# Patient Record
Sex: MALE | Race: WHITE | NOT HISPANIC OR LATINO | URBAN - METROPOLITAN AREA
[De-identification: names, ages, dates, MRNs, and addresses within clinical notes are randomized per-mention and may not be internally consistent; named-entity substitution may affect disease eponyms.]

---

## 2017-11-06 ENCOUNTER — OFFICE VISIT - HEALTHEAST (OUTPATIENT)
Dept: FAMILY MEDICINE | Facility: CLINIC | Age: 72
End: 2017-11-06

## 2017-11-06 ENCOUNTER — RECORDS - HEALTHEAST (OUTPATIENT)
Dept: GENERAL RADIOLOGY | Facility: CLINIC | Age: 72
End: 2017-11-06

## 2017-11-06 DIAGNOSIS — S60.459A: ICD-10-CM

## 2017-11-06 DIAGNOSIS — S69.90XA FINGER INJURY: ICD-10-CM

## 2017-11-06 DIAGNOSIS — S69.90XA UNSPECIFIED INJURY OF UNSPECIFIED WRIST, HAND AND FINGER(S), INITIAL ENCOUNTER: ICD-10-CM

## 2017-11-06 ASSESSMENT — MIFFLIN-ST. JEOR: SCORE: 1259.85

## 2017-11-07 ENCOUNTER — RECORDS - HEALTHEAST (OUTPATIENT)
Dept: ADMINISTRATIVE | Facility: OTHER | Age: 72
End: 2017-11-07

## 2017-11-13 ENCOUNTER — HOSPITAL ENCOUNTER (OUTPATIENT)
Dept: ULTRASOUND IMAGING | Facility: CLINIC | Age: 72
Discharge: HOME | End: 2017-11-13
Attending: PHYSICIAN ASSISTANT

## 2017-11-13 ENCOUNTER — RECORDS - HEALTHEAST (OUTPATIENT)
Dept: ADMINISTRATIVE | Facility: OTHER | Age: 72
End: 2017-11-13

## 2017-11-13 DIAGNOSIS — M79.644 FINGER PAIN, RIGHT: ICD-10-CM

## 2017-11-15 ENCOUNTER — RECORDS - HEALTHEAST (OUTPATIENT)
Dept: ADMINISTRATIVE | Facility: OTHER | Age: 72
End: 2017-11-15

## 2018-06-06 ENCOUNTER — OFFICE VISIT - HEALTHEAST (OUTPATIENT)
Dept: AUDIOLOGY | Facility: CLINIC | Age: 73
End: 2018-06-06

## 2018-06-06 DIAGNOSIS — H93.11 TINNITUS OF RIGHT EAR: ICD-10-CM

## 2018-06-06 DIAGNOSIS — H90.3 SENSORINEURAL HEARING LOSS, BILATERAL: ICD-10-CM

## 2018-06-06 DIAGNOSIS — H93.8X1 PLUGGED FEELING IN EAR, RIGHT: ICD-10-CM

## 2018-12-12 ENCOUNTER — OFFICE VISIT - HEALTHEAST (OUTPATIENT)
Dept: OTOLARYNGOLOGY | Facility: CLINIC | Age: 73
End: 2018-12-12

## 2018-12-12 ENCOUNTER — OFFICE VISIT - HEALTHEAST (OUTPATIENT)
Dept: AUDIOLOGY | Facility: CLINIC | Age: 73
End: 2018-12-12

## 2018-12-12 DIAGNOSIS — H61.23 BILATERAL IMPACTED CERUMEN: ICD-10-CM

## 2018-12-12 DIAGNOSIS — H90.3 SENSORINEURAL HEARING LOSS, BILATERAL: ICD-10-CM

## 2018-12-12 DIAGNOSIS — H93.11 TINNITUS OF RIGHT EAR: ICD-10-CM

## 2019-03-18 ENCOUNTER — COMMUNICATION - HEALTHEAST (OUTPATIENT)
Dept: FAMILY MEDICINE | Facility: CLINIC | Age: 74
End: 2019-03-18

## 2019-03-18 ENCOUNTER — OFFICE VISIT - HEALTHEAST (OUTPATIENT)
Dept: FAMILY MEDICINE | Facility: CLINIC | Age: 74
End: 2019-03-18

## 2019-03-18 DIAGNOSIS — Z01.818 PREOPERATIVE EVALUATION TO RULE OUT SURGICAL CONTRAINDICATION: ICD-10-CM

## 2019-03-18 DIAGNOSIS — S92.001P: ICD-10-CM

## 2019-03-18 DIAGNOSIS — Z88.0 PENICILLIN ALLERGY: ICD-10-CM

## 2019-03-18 LAB
ALBUMIN SERPL-MCNC: 3.4 G/DL (ref 3.5–5)
ALP SERPL-CCNC: 64 U/L (ref 45–120)
ALT SERPL W P-5'-P-CCNC: 18 U/L (ref 0–45)
ANION GAP SERPL CALCULATED.3IONS-SCNC: 8 MMOL/L (ref 5–18)
AST SERPL W P-5'-P-CCNC: 26 U/L (ref 0–40)
ATRIAL RATE - MUSE: 60 BPM
BILIRUB SERPL-MCNC: 0.4 MG/DL (ref 0–1)
BUN SERPL-MCNC: 14 MG/DL (ref 8–28)
CALCIUM SERPL-MCNC: 9 MG/DL (ref 8.5–10.5)
CHLORIDE BLD-SCNC: 95 MMOL/L (ref 98–107)
CO2 SERPL-SCNC: 28 MMOL/L (ref 22–31)
CREAT SERPL-MCNC: 0.73 MG/DL (ref 0.7–1.3)
DIASTOLIC BLOOD PRESSURE - MUSE: NORMAL MMHG
ERYTHROCYTE [DISTWIDTH] IN BLOOD BY AUTOMATED COUNT: 12.1 % (ref 11–14.5)
GFR SERPL CREATININE-BSD FRML MDRD: >60 ML/MIN/1.73M2
GLUCOSE BLD-MCNC: 94 MG/DL (ref 70–125)
HCT VFR BLD AUTO: 41.1 % (ref 40–54)
HGB BLD-MCNC: 13.8 G/DL (ref 14–18)
INTERPRETATION ECG - MUSE: NORMAL
MCH RBC QN AUTO: 30.7 PG (ref 27–34)
MCHC RBC AUTO-ENTMCNC: 33.6 G/DL (ref 32–36)
MCV RBC AUTO: 91 FL (ref 80–100)
P AXIS - MUSE: 60 DEGREES
PLATELET # BLD AUTO: 361 THOU/UL (ref 140–440)
PMV BLD AUTO: 6.8 FL (ref 7–10)
POTASSIUM BLD-SCNC: 4.3 MMOL/L (ref 3.5–5)
PR INTERVAL - MUSE: 184 MS
PROT SERPL-MCNC: 6.3 G/DL (ref 6–8)
QRS DURATION - MUSE: 76 MS
QT - MUSE: 444 MS
QTC - MUSE: 444 MS
R AXIS - MUSE: 70 DEGREES
RBC # BLD AUTO: 4.5 MILL/UL (ref 4.4–6.2)
SODIUM SERPL-SCNC: 131 MMOL/L (ref 136–145)
SYSTOLIC BLOOD PRESSURE - MUSE: NORMAL MMHG
T AXIS - MUSE: 68 DEGREES
VENTRICULAR RATE- MUSE: 60 BPM
WBC: 6.1 THOU/UL (ref 4–11)

## 2019-03-21 ENCOUNTER — COMMUNICATION - HEALTHEAST (OUTPATIENT)
Dept: FAMILY MEDICINE | Facility: CLINIC | Age: 74
End: 2019-03-21

## 2019-04-22 ENCOUNTER — OFFICE VISIT - HEALTHEAST (OUTPATIENT)
Dept: ALLERGY | Facility: CLINIC | Age: 74
End: 2019-04-22

## 2019-04-22 DIAGNOSIS — Z88.9 DRUG ALLERGY: ICD-10-CM

## 2019-04-22 ASSESSMENT — MIFFLIN-ST. JEOR: SCORE: 1259.85

## 2019-06-10 ENCOUNTER — AMBULATORY - HEALTHEAST (OUTPATIENT)
Dept: FAMILY MEDICINE | Facility: CLINIC | Age: 74
End: 2019-06-10

## 2019-06-10 DIAGNOSIS — H90.3 ASYMMETRICAL SENSORINEURAL HEARING LOSS: ICD-10-CM

## 2019-09-04 ENCOUNTER — OFFICE VISIT - HEALTHEAST (OUTPATIENT)
Dept: OTOLARYNGOLOGY | Facility: CLINIC | Age: 74
End: 2019-09-04

## 2019-09-04 DIAGNOSIS — H61.23 BILATERAL IMPACTED CERUMEN: ICD-10-CM

## 2020-06-04 ENCOUNTER — COMMUNICATION - HEALTHEAST (OUTPATIENT)
Dept: SCHEDULING | Facility: CLINIC | Age: 75
End: 2020-06-04

## 2020-06-04 ENCOUNTER — OFFICE VISIT - HEALTHEAST (OUTPATIENT)
Dept: FAMILY MEDICINE | Facility: CLINIC | Age: 75
End: 2020-06-04

## 2020-06-04 DIAGNOSIS — S69.92XA INJURY OF FINGER OF LEFT HAND, INITIAL ENCOUNTER: ICD-10-CM

## 2020-06-04 DIAGNOSIS — L03.011 PARONYCHIA OF FINGER OF RIGHT HAND: ICD-10-CM

## 2020-08-12 ENCOUNTER — RECORDS - HEALTHEAST (OUTPATIENT)
Dept: ADMINISTRATIVE | Facility: OTHER | Age: 75
End: 2020-08-12

## 2020-12-21 ENCOUNTER — OFFICE VISIT - HEALTHEAST (OUTPATIENT)
Dept: OTOLARYNGOLOGY | Facility: CLINIC | Age: 75
End: 2020-12-21

## 2020-12-21 ENCOUNTER — OFFICE VISIT - HEALTHEAST (OUTPATIENT)
Dept: FAMILY MEDICINE | Facility: CLINIC | Age: 75
End: 2020-12-21

## 2020-12-21 DIAGNOSIS — K14.8 TONGUE LESION: ICD-10-CM

## 2020-12-24 LAB
LAB AP CHARGES (HE HISTORICAL CONVERSION): NORMAL
PATH REPORT.COMMENTS IMP SPEC: NORMAL
PATH REPORT.FINAL DX SPEC: NORMAL
PATH REPORT.GROSS SPEC: NORMAL
PATH REPORT.MICROSCOPIC SPEC OTHER STN: NORMAL
PATH REPORT.RELEVANT HX SPEC: NORMAL
RESULT FLAG (HE HISTORICAL CONVERSION): NORMAL

## 2021-01-08 ENCOUNTER — COMMUNICATION - HEALTHEAST (OUTPATIENT)
Dept: OTOLARYNGOLOGY | Facility: CLINIC | Age: 76
End: 2021-01-08

## 2021-01-13 ENCOUNTER — COMMUNICATION - HEALTHEAST (OUTPATIENT)
Dept: OTOLARYNGOLOGY | Facility: CLINIC | Age: 76
End: 2021-01-13

## 2021-05-28 NOTE — PATIENT INSTRUCTIONS - HE
Assessment:    Distant history of penicillin allergy from childhood however had amoxicillin approximately 10 to 15 years ago.  No symptoms of immediate hypersensitivity with that.  Therefore, Venu should not be considered allergic to penicillin.    Plan:    All penicillin family antibiotics can be used and drug allergies would be removed from his allergy list.

## 2021-05-28 NOTE — PROGRESS NOTES
Assessment:    Distant history of penicillin allergy from childhood however had amoxicillin approximately 10 years ago.  No symptoms of immediate hypersensitivity with that.  Therefore, Venu should not be considered allergic to penicillin.    Plan:    All penicillin family antibiotics can be used  We will remove penicillins from his drug allergy list.  ____________________________________________________________________________     Patient comes in today for penicillin allergy evaluation.  He reports that he had a reaction to penicillin when he was approximately 4 years old.  He reports he had an appendicitis at that time and had surgery.  He was given antibiotic at that time and reportedly had a reaction.  We do not have those medical records available.  The details of that reaction are unknown.  He does not think there was a life-threatening reaction.  Since that time he has had penicillin allergy labeled in his chart.  However, he had amoxicillin approximately 10 years ago.  He recalls being ill with upper respiratory symptoms he took a full course of amoxicillin.  He seemed to get worse with symptoms afterwards and was ultimately diagnosed with sepsis however no symptoms of hives swelling or wheezing with amoxicillin.    Review of symptoms:  As above, otherwise negative    Past medical history: No other chronic medical conditions noted.    Allergies: No known allergies to medications, latex, foods or hymenoptera venom    Social history: Currently is a house with radiator heat.  There is central air conditioning in her basement present.  2 cats at home.  No cigarette smoking history.    Medications: reviewed in chart    20 min spent in direct contact with the patient.  More than 50% in counseling regarding drug allergy

## 2021-05-31 VITALS — BODY MASS INDEX: 20.41 KG/M2 | HEIGHT: 66 IN | WEIGHT: 127 LBS

## 2021-06-01 NOTE — PROGRESS NOTES
HPI: This patient presents to clinic today for cerumen removal. Has noticed some fullness of the ears and muffled hearing, but denies otalgia, otorrhea, vertigo, change in true hearing or tinnitus. Denies other symptoms.    Past medical history, surgical history, family history, social history, medications, and allergies have been reviewed and are documented above.     Review of Systems: a 10-system review was performed. Symptoms are noted in the HPI and on a scanned document in the computer chart.    Physical Examination:   GEN: no acute distress, alert and oriented  EYES: extraocular movements intact, pupils equal and round. Sclera clear.   EARS: bilateral cerumen impactions cleared under binocular microscopy using microdissection. The tympanic membranes are noted to be intact and clear with no signs of infections, effusions, perforations, or retractions.  PULM: breathing comfortably on room air with no stertor or stridor. Chest expansion symmetric.  HEART: regular rate and rhythm, no peripheral edema    MEDICAL DECISION-MAKING: cerumen impactions cleared under binocular microscopy without difficulty. Hearing restored to baseline. Follow-up PRN.

## 2021-06-02 VITALS — WEIGHT: 127 LBS | BODY MASS INDEX: 20.41 KG/M2 | HEIGHT: 66 IN

## 2021-06-05 VITALS
BODY MASS INDEX: 22.12 KG/M2 | OXYGEN SATURATION: 99 % | WEIGHT: 136 LBS | RESPIRATION RATE: 16 BRPM | HEART RATE: 64 BPM | DIASTOLIC BLOOD PRESSURE: 77 MMHG | SYSTOLIC BLOOD PRESSURE: 118 MMHG

## 2021-06-08 NOTE — PATIENT INSTRUCTIONS - HE
Continue symptomatic care.  Take the prescribed antibiotic as directed, you can add some probiotic to minimize side effect.  Follow-up in 10 to 14 days if not improved.

## 2021-06-08 NOTE — TELEPHONE ENCOUNTER
Triage call:   Scraped his finger a couple weeks ago on a bush   Right index knuckle   Reports that there is a white cap to the injury and is red and swollen across the top of the knuckle  States that he stuck a pin in the area that was white and was unable to get anything out- states that the white area doesn't hurt but the red area is very painful.    Has been soak in Epson salts   No streaks up his arm   Reports that it throbs at night- painful to bend the finger   Initially was just a very superficial scrape     Triaged to be seen within the next 4 hours- patient states that he is unable to schedule a video visit - patient thinks that this might need to be seen in person. Need PCP approval to be seen in the office.  Reviewed additional care advice with patient and he verbalizes understanding. PCP please advise on face to face visit today.     Em Shepherd RN BSBA [facility] Triage/Med Refill 6/4/2020 7:33 AM      Reason for Disposition    Looks infected (e.g., spreading redness, pus, red streak)    Additional Information    Negative: Major bleeding (actively dripping or spurting) that can't be stopped    Negative: Sounds like a life-threatening emergency to the triager    Negative: Wound looks infected    Negative: Caused by animal bite    Negative: Amputation    Negative: High-pressure injection injury (e.g., from paint gun, usually work-related)    Negative: Looks like a broken bone or dislocated joint (e.g., crooked or deformed)    Negative: Skin is split open or gaping (length > 1/2 inch or 12 mm)    Negative: Cut or scrape is very deep (e.g., can see bone or tendons)    Negative: Bleeding won't stop after 10 minutes of direct pressure (using correct technique)    Negative: Dirt in the wound and not removed after 15 minutes of scrubbing    Negative: Cut with numbness (loss of sensation) of finger    Negative: Fingernail is partially torn from a crush injury (EXCEPTION: torn nail from catching it on  something)    Negative: Sounds like a serious injury to the triager    Protocols used: FINGER INJURY-A-OH

## 2021-06-08 NOTE — TELEPHONE ENCOUNTER
Spoke w/patient, relayed message per MD. Scheduled video visit for today @11:20am. Please send to email address, patient has [employer] computer.

## 2021-06-08 NOTE — PROGRESS NOTES
"Venu Dunham is a 74 y.o. male who is being evaluated via a billable video visit.      The patient has been notified of following:     \"This video visit will be conducted via a call between you and your physician/provider. We have found that certain health care needs can be provided without the need for an in-person physical exam.  This service lets us provide the care you need with a video conversation.  If a prescription is necessary we can send it directly to your pharmacy.  If lab work is needed we can place an order for that and you can then stop by our lab to have the test done at a later time.    Video visits are billed at different rates depending on your insurance coverage. Please reach out to your insurance provider with any questions.    If during the course of the call the physician/provider feels a video visit is not appropriate, you will not be charged for this service.\"    Patient has given verbal consent to a Video visit? Yes    Patient would like to receive their AVS by AVS Preference: Aide.    Patient would like the video invitation sent by: Send to e-mail at: [email or web address]    Will anyone else be joining your video visit? No        Video Start Time: 11:20 am    Additional provider notes: Patient complains of surface infection top of mid knuckle right index finger.  Sealed over a white cap with surrounding inflammation which does not heal.  Started after cutting branches a few days ago.  Did try to poke a needle in it but very minimal pus came out.  Denies any fever chills or difficulty moving his right knuckle.     GENERAL: Healthy, alert and no distress  EYES: Eyes grossly normal to inspection. No discharge or erythema, or obvious scleral/conjunctival abnormalities.  RESP: No audible wheeze, cough, or visible cyanosis.  No visible retractions or increased work of breathing.    SKIN: Right index knuckle with small redness for about half a centimeter-a small white dot area. "  Difficult to assess sensation and range of motion on video.  NEURO: Cranial nerves grossly intact. Mentation and speech appropriate for age.  PSYCH: Mentation appears normal, affect normal/bright, judgement and insight intact, normal speech and appearance well-groomed  Venu was seen today for hand pain.    Diagnoses and all orders for this visit:    Paronychia of finger of right hand      Treatment options with risk and benefit discussed with the patient, included incision and drainage, oral antibiotic etc., patient would like an incision and drainage, will have him, the clinic tomorrow.    Video-Visit Details    Type of service:  Video Visit    Video End Time (time video stopped): 11:40 am  Originating Location (pt. Location): Home    Distant Location (provider location):  [facility] FAMILY MEDICINE/OB     Platform used for Video Visit: Ismael Meyers MD  Addendum made on June 5, 2020.  Patient was scheduled for a procedure today, he did call stating that he was able to squeeze the area on the right knuckle and he did express good amount of pus and it  looks less swollen and painful, still a little bit red, he would rather have an antibiotic and will let us know if not improved.    Discussed continue symptomatic care, will send cephalexin to take as directed to follow-up if not improving.

## 2021-06-13 NOTE — PROGRESS NOTES
"HISTORY OF PRESENT ILLNESS  Asked to see by Dr. Meyers for evaluation of tongue. Patient reports tongue lesion. Patient reports that he saw something in the mouth that is of concern. His tongue is sore in the morning and burns when he stretche it. He reports that it feels \"bubbly to his tongue on the left.\" No problems swallowing. No change in voice.    REVIEW OF SYSTEMS  Review of Systems: a 10-system review was performed. Pertinent positives are noted in the HPI and on a separate scanned document in the chart.    PMH, PSH, FH and SH has documented in the EHR.      EXAM    CONSTITUTIONAL  General Appearance:  Normal, well developed, well nourished, no obvious distress  Ability to Communicate:  communicates appropriately.    HEAD AND FACE  Appearance and Symmetry:  Normal, no scalp or facial scarring or suspicious lesions.    EYE  Normal external eye, conjunctiva, lids, cornea.     EARS  Clinical speech reception threshold:  Normal, able to hear normal speech.  Auricle:  Normal, Auricles without scars, lesions, masses.  External auditory canal:  Normal, External auditory canal normal.  Tympanic membrane:  Normal, Tympanic membranes normal without swelling or erythema.  Tympanic membrane mobility:  Normal, Normal tympanic membrane mobility.    NOSE (speculum or scope)  Architecture:  Normal, Grossly normal external nasal architecture with no masses or lesions.  Mucosa:  Normal mucosa, No polyps or masses.  Septum:  Normal, Septum non-obstructing.  Turbinates:  Normal, No turbinate abnormalities    ORAL CAVITY AND OROPHARYNX  Lips:  Normal.  Dental and gingiva:  Normal, No obvious dental or gingival disease.  Mucosa:  Normal, Moist mucous membranes.  Tongue:  Leukoplakia on the left lateral tongue.  Hard and soft palate:  Normal, Hard and soft palate without cleft or mucosal lesions.  Oral pharynx:  Normal, Posterior pharynx without lesions or remarkable asymmetry.  Saliva:  Normal, Clear saliva.  Masses:  Normal, No " palpable masses or pathologically enlarged lymph nodes.    NECK  Masses/lymph nodes:  Normal, No worrisome neck masses or lymph nodes.  Salivary glands:  Normal, Parotid and submandibular glands.  Trachea and larynx position:  Normal, Trachea and larynx midline.  Thyroid:  Normal, No thyroid abnormality.  Tenderness:  Normal, No cervical tenderness.  Suppleness:  Normal, Neck supple    CARDIOVASCULAR  Regular rate and rhythm.  No cyanosis, clubbing or edema.    PULSES  Carotid pulses normal    NEUROLOGICAL  Speech pattern:  Normal, Proasaic    RESPIRATORY  Symmetry and Respiratory effort:  Normal, Symmetric chest movement and expansion with no increased intercostal retractions or use of accessory muscles.     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 No evidence of leukoplakia anywhere else in the oral cavity.    RECOMMENDATION  I discussed findings and concern. I advised biopsy and the patient agreed. The area was infiltrated with lidocaine plus epinephrine solution. After allowing adequate time for anesthesia, a 4mm punch was used to incise the tongue. The specimen was removed with scissors and pickup and placed in formalin. I explained that we will be in touch with resultsl    Syed Gillette MD

## 2021-06-13 NOTE — PROGRESS NOTES
"OFFICE VISIT - FAMILY MEDICINE     ASSESSMENT AND PLAN     1. Finger injury  XR Finger Right 2 or More VWS    Ambulatory referral to Orthopedics   2. Foreign body of finger of right hand  Ambulatory referral to Orthopedics   X-rays personally reviewed did not show presence of any foreign body, after discussion with the patient, he would like further treatment to be done included possible incision and drainage, he was referred to hand specialist.    CHIEF COMPLAINT   Hand Problem (\"FINGER INFECTION\", TRIGGER FINGER X 1 WK)    HPI   Venu Dunham is a 72 y.o. male.  Updated MIIC - Needs DXA/Zosavax/PC-13    Right index finger injury about a week ago while doing some yard work, he was able to pull out a small  Sliver on the distal portion of the medial index finger.  Has been noticing some swelling in that area,but no difficulty moving his finger, no discharge, no redness.  Patient is concerned about infection, minimal discomfort.  No Numbness or tingling.  Review of Systems As per HPI, otherwise negative.    OBJECTIVE   /78 (Patient Site: Right Arm)  Pulse 72  Resp 14  Ht 5' 5.75\" (1.67 m)  Wt 127 lb (57.6 kg)  BMI 20.65 kg/m2  Physical Exam   Constitutional: He is oriented to person, place, and time. He appears well-developed and well-nourished.   HENT:   Head: Normocephalic and atraumatic.   Cardiovascular: Normal rate, regular rhythm, normal heart sounds and intact distal pulses.  Exam reveals no gallop and no friction rub.    No murmur heard.  Pulmonary/Chest: Effort normal and breath sounds normal. No respiratory distress. He has no wheezes. He has no rales.   Musculoskeletal: He exhibits edema (Right index  distal interphalangeal joint area, no discharge, No open wound). He exhibits no tenderness or deformity.   Neurological: He is alert and oriented to person, place, and time. Coordination normal.   Psychiatric: He has a normal mood and affect. Judgment and thought content normal.       PFSH "   No family history on file.  Social History     Social History     Marital status:      Spouse name: N/A     Number of children: N/A     Years of education: N/A     Occupational History     Not on file.     Social History Main Topics     Smoking status: Never Smoker     Smokeless tobacco: Never Used     Alcohol use 0.6 oz/week     1 Glasses of wine per week     Drug use: Not on file     Sexual activity: Not on file     Other Topics Concern     Not on file     Social History Narrative     Relevant history was reviewed with the patient today, unless noted in HPI, nothing is pertinent for this visit.  [facility]     Patient Active Problem List    Diagnosis Date Noted     External Hemorrhoids      Overview Note:     Created by Conversion    Replacement Utility updated for latest IMO load       Skin Condition      Overview Note:     Created by [employer]  [facility] Annotation: Jul 23 2013  4:36PM - Carlos Varma: left facial    Replacement Utility updated for latest IMO load       Past Surgical History:   Procedure Laterality Date     NE APPENDECTOMY      Description: Appendectomy;  Recorded: 04/22/2014;     NE KNEE SCOPE,DIAGNOSTIC      Description: Arthroscopy Knee Left;  Recorded: 04/22/2014;       RESULTS/CONSULTS (Lab/Rad)   No results found for this or any previous visit (from the past 168 hour(s)).  Xr Finger Right 2 Or More Vws    Result Date: 11/6/2017  XR FINGER RIGHT 2 OR MORE VWS 11/6/2017 10:02 AM INDICATION: Sliver, swollen, pressure. COMPARISON: None. FINDINGS: No fracture or dislocation. No bony erosion. Soft tissue swelling. No opaque foreign body. This report was electronically interpreted by: Dr. Bryant Pool DO ON 11/06/2017 at 10:43    MEDICATIONS     Current Outpatient Prescriptions on File Prior to Visit   Medication Sig Dispense Refill     aspirin 81 MG EC tablet Take 81 mg by mouth daily.       No current facility-administered medications on file prior to visit.        [employer]  MAINTENANCE / SCREENING   PHQ-2 Total Score: 0 (11/6/2017  9:44 AM), No Data Recorded,No Data Recorded  Immunization History   Administered Date(s) Administered     Hep A, historic 12/16/1998, 10/26/1999     Hep B, historic 10/26/1999, 12/07/1999, 06/26/2000     IPV 04/05/1993     Influenza, inj, historic 12/16/1998, 10/22/1999, 10/04/2010, 09/28/2012, 09/18/2014, 10/04/2015, 10/04/2016, 10/11/2017     Influenza, seasonal,quad inj 6-35 mos 10/27/2011, 09/23/2013     Pneumo Polysac 23-V 04/22/2014     Td, historic 10/05/1992, 08/02/2002, 04/22/2014     Health Maintenance   Topic     ADVANCE DIRECTIVES DISCUSSED WITH PATIENT      ZOSTER VACCINE      PNEUMOCOCCAL CONJUGATE VACCINE FOR ADULTS (PCV13 OR PREVNAR)      INFLUENZA VACCINE RULE BASED (1)     FALL RISK ASSESSMENT      COLONOSCOPY      TD 18+ HE      PNEUMOCOCCAL POLYSACCHARIDE VACCINE AGE 65 AND OVER        Marco A Meyers MD  Family Medicine, [facility]     This note was dictated using a voice recognition software.  Any grammatical or context distortion are unintentional and inherent to the software.

## 2021-06-14 NOTE — TELEPHONE ENCOUNTER
----- Message from Syed VALLECILLO MD sent at 1/6/2021  4:50 PM CST -----  You can let the patient know that his biopsy did not show evidence of cancer. Follow up if questions.   ----- Message -----  From: Lab, Background User  Sent: 12/24/2020  12:39 PM CST  To: Syed VALLECILLO MD

## 2021-06-14 NOTE — TELEPHONE ENCOUNTER
Tried calling patient. No answer. No voicemail available.    Sapphire Latham RN  [facility]  [phone]

## 2021-06-14 NOTE — TELEPHONE ENCOUNTER
Unable to reach patient via telephone. Results sent via [employer].    Sapphire Latham RN  [facility]  [phone]

## 2021-06-18 NOTE — PROGRESS NOTES
Audiology Report:      History:  Venu Dunham is seen today for comprehensive hearing evaluation. He has a history of difficulty hearing, tinnitus, and aural fullness in the right ear for the past 3 years. He reports the tinnitus sounds like a constant hiss. 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 He denies a history of otalgia, otorrhea, dizziness, ear surgery, and family history of hearing loss.     Results:     Left Ear Right Ear   Otoscopy clear canals Nearly occluding cerumen   Pure Tone Audiometry normal hearing from 250-4000 Hz sloping to mild sensorineural hearing loss  normal hearing from 250-2000 Hz sloping to moderately-severe sensorineural hearing loss    Word Recognition excellent excellent   Tympanometry normal (Type A)  normal (Type A)     Transducer: Insert earphones and Circumaural headphones    Reliability was good and there was good SRT to PTA agreement.       Plan:  Results are discussed in detail. A referral to ENT is recommended due to the asymmetrical hearing loss and tinnitus. Venu is not interested in scheduling an appointment with an ENT at this time. He should return for retesting in one year to monitor the asymmetrical hearing loss. He is counseled on tinnitus.    Please see audiogram under  media  and  audiogram  in the patient s chart.     Prosper Colbert., CCC-A  [place] Licensed Audiologist #[id number]

## 2021-06-19 NOTE — LETTER
Letter by Marco A Meyers MD at      Author: Marco A Meyers MD Service: -- Author Type: --    Filed:  Encounter Date: 3/21/2019 Status: (Other)         Venu Dunham  [address]  [place] [address]             March 21, 2019         Dear Mr. Dunham,    Below are the results from your recent visit:    Resulted Orders   HM2(CBC w/o Differential)   Result Value Ref Range    WBC 6.1 4.0 - 11.0 thou/uL    RBC 4.50 4.40 - 6.20 mill/uL    Hemoglobin 13.8 (L) 14.0 - 18.0 g/dL    Hematocrit 41.1 40.0 - 54.0 %    MCV 91 80 - 100 fL    MCH 30.7 27.0 - 34.0 pg    MCHC 33.6 32.0 - 36.0 g/dL    RDW 12.1 11.0 - 14.5 %    Platelets 361 140 - 440 thou/uL    MPV 6.8 (L) 7.0 - 10.0 fL   Comprehensive Metabolic Panel   Result Value Ref Range    Sodium 131 (L) 136 - 145 mmol/L    Potassium 4.3 3.5 - 5.0 mmol/L    Chloride 95 (L) 98 - 107 mmol/L    CO2 28 22 - 31 mmol/L    Anion Gap, Calculation 8 5 - 18 mmol/L    Glucose 94 70 - 125 mg/dL    BUN 14 8 - 28 mg/dL    Creatinine 0.73 0.70 - 1.30 mg/dL    GFR MDRD Af Amer >60 >60 mL/min/1.73m2    GFR MDRD Non Af Amer >60 >60 mL/min/1.73m2    Bilirubin, Total 0.4 0.0 - 1.0 mg/dL    Calcium 9.0 8.5 - 10.5 mg/dL    Protein, Total 6.3 6.0 - 8.0 g/dL    Albumin 3.4 (L) 3.5 - 5.0 g/dL    Alkaline Phosphatase 64 45 - 120 U/L    AST 26 0 - 40 U/L    ALT 18 0 - 45 U/L    Narrative    Fasting Glucose reference range is 70-99 mg/dL per  American Diabetes Association (ADA) guidelines.       Sodium level was mildly low, we will recheck in the few weeks.    Please call with questions or contact us using [employer].    Sincerely,        Electronically signed by Marco A Meyers MD
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
## 2021-06-22 NOTE — PROGRESS NOTES
" HPI  Venu Dunham is a 73 y.o. male seen in consultation at the request of Dr. Meyers for a sound in his right ear and wax in his ear. Since getting his ear suctioned 3 years ago for cerumen removal, patient notes a \"jingling\" soun in his right ear when his head is back and he swallows.  Denies history of ear infections or surgeries.  Denies vertigo.  Denies family history of hearing loss.  Denies noise exposure.HPI    Past medical history, surgical history, family history, social history, medications, and allergies have been reviewed and are documented above.     Review of Systems: a 10-system review was performed. Symptoms are noted in the HPI and on a scanned document in the computer chart.    Physical Examination:   GEN: no acute distress, alert and oriented  EYES: extraocular movements intact, pupils equal and round. Sclera clear.   EARS: bilateral cerumen impactions cleared under binocular microscopy using microdissection. The tympanic membranes are noted to be intact and clear with no signs of infections, effusions, perforations, or retractions.  PULM: breathing comfortably on room air with no stertor or stridor. Chest expansion symmetric.  HEART: regular rate and rhythm, no peripheral edema    MEDICAL DECISION-MAKING: cerumen impactions cleared under binocular microscopy without difficulty. Hearing restored to baseline. Follow-up audiology.    "

## 2021-06-25 NOTE — TELEPHONE ENCOUNTER
Question following Office Visit  When did you see your provider: 3/18/2019      What is your question: States please fax today's OV for pre op to [facility] at [phone].  Also fax pre op to [phone] atttarik Blackwell.          Okay to leave a detailed message: Yes, if need.

## 2021-06-25 NOTE — PROGRESS NOTES
Preoperative Exam    Scheduled Procedure: Calcaneous bone repair  Surgery Date:  3/22/19  Surgery Location: [facility]   Fax: [phone] and [phone] attn [place]  Surgeon:  Dr. Epi Blackwood    Assessment/Plan:     1. Preoperative evaluation to rule out surgical contraindication  - HM2(CBC w/o Differential)  - Comprehensive Metabolic Panel  - Electrocardiogram Perform - Clinic    2. Closed nondisplaced fracture of right calcaneus with malunion, unspecified portion of calcaneus, subsequent encounter  Scheduled for open reduction internal fixation.    History of penicillin allergies, referral to see allergist for desensitization therapy.    Surgical Procedure Risk: Low (reported cardiac risk generally < 1%)  Have you had prior anesthesia?: Yes  Have you or any family members had a previous anesthesia reaction:  No  Do you or any family members have a history of a clotting or bleeding disorder?: No  Cardiac Risk Assessment: no increased risk for major cardiac complications    Reviewed risks (not limited to bleeding,infection,pain,un-anticipated response to anesthesia ecc) and benefits (diagnostic and therapeutic) of surgery with patient, he understands the risks of the procedure and would like to proceed.  Patient's active problems diagnostically and therapeutically optimized for planned procedure with, Local or General anesthesia      Functional Status: Dependent: yes  Patient plans to recover at home with family.     Subjective:      Venu Dunham is a 73 y.o. male who presents for a preoperative consultation.  Fell from a ladder a few days ago, fracturing this right calcaneus, seen by orthopedics and deemed to be a good candidate for surgical repair.    All other systems reviewed and are negative, other than those listed in the HPI.    Pertinent History  Do you have difficulty breathing or chest pain after walking up a flight of stairs: No  History of obstructive sleep apnea: No  Steroid use in  the last 6 months: No  Frequent Aspirin/NSAID use: Yes: will stop 7 days before surgery  Prior Blood Transfusion: Yes: .  Prior Blood Transfusion Reaction: No  If for some reason prior to, during or after the procedure, if it is medically indicated, would you be willing to have a blood transfusion?:  There is no transfusion refusal.    Current Outpatient Medications   Medication Sig Dispense Refill     aspirin 81 MG EC tablet Take 81 mg by mouth daily.       No current facility-administered medications for this visit.         Allergies   Allergen Reactions     Amoxicillin      Penicillins        Patient Active Problem List   Diagnosis     Skin Condition     External Hemorrhoids       No past medical history on file.    Past Surgical History:   Procedure Laterality Date     NOSE SURGERY       AK APPENDECTOMY      Description: Appendectomy;  Recorded: 04/22/2014;     AK KNEE SCOPE,DIAGNOSTIC      Description: Arthroscopy Knee Left;  Recorded: 04/22/2014;       Social History     Socioeconomic History     Marital status:      Spouse name: Not on file     Number of children: Not on file     Years of education: Not on file     Highest education level: Not on file   Occupational History     Not on file   Social Needs     Financial resource strain: Not on file     Food insecurity:     Worry: Not on file     Inability: Not on file     Transportation needs:     Medical: Not on file     Non-medical: Not on file   Tobacco Use     Smoking status: Never Smoker     Smokeless tobacco: Never Used   Substance and Sexual Activity     Alcohol use: Yes     Alcohol/week: 0.6 oz     Types: 1 Glasses of wine per week     Drug use: Not on file     Sexual activity: Not on file   Lifestyle     Physical activity:     Days per week: Not on file     Minutes per session: Not on file     Stress: Not on file   Relationships     Social connections:     Talks on phone: Not on file     Gets together: Not on file     Attends Gnosticism service:  Not on file     Active member of club or organization: Not on file     Attends meetings of clubs or organizations: Not on file     Relationship status: Not on file     Intimate partner violence:     Fear of current or ex partner: Not on file     Emotionally abused: Not on file     Physically abused: Not on file     Forced sexual activity: Not on file   Other Topics Concern     Not on file   Social History Narrative     Not on file       Patient Care Team:  Marco A Meyers MD as PCP - General (Family Medicine)          Objective:     Vitals:    03/18/19 1309   BP: 110/66   Pulse: 68   Resp: 16   Temp: 97.6  F (36.4  C)   TempSrc: Oral         Physical Exam:  Physical Examination: General appearance - alert, well appearing, and in no distress  Mental status - alert, oriented to person, place, and time  Eyes - pupils equal and reactive, extraocular eye movements intact  Ears - bilateral TM's and external ear canals normal  Nose - normal and patent, no erythema, discharge or polyps  Mouth - mucous membranes moist, pharynx normal without lesions  Neck - supple, no significant adenopathy  Lymphatics - no palpable lymphadenopathy, no hepatosplenomegaly  Chest - clear to auscultation, no wheezes, rales or rhonchi, symmetric air entry  Heart - normal rate, regular rhythm, normal S1, S2, no murmurs, rubs, clicks or gallops  Abdomen - soft, nontender, nondistended, no masses or organomegaly  Neurological - alert, oriented, normal speech, no focal findings or movement disorder noted  Musculoskeletal - no joint tenderness, deformity or swelling  Extremities - peripheral pulses normal, no pedal edema, no clubbing or cyanosis  Skin - normal coloration and turgor, no rashes, no suspicious skin lesions noted      EKG: Independently reviewed showed a normal sinus rhythm, nonspecific ST-T wave abnormalities.    Labs:  Recent Results (from the past 48 hour(s))   Electrocardiogram Perform - Clinic    Collection Time: 03/18/19   1:31 PM   Result Value Ref Range    SYSTOLIC BLOOD PRESSURE  mmHg    DIASTOLIC BLOOD PRESSURE  mmHg    VENTRICULAR RATE 60 BPM    ATRIAL RATE 60 BPM    P-R INTERVAL 184 ms    QRS DURATION 76 ms    Q-T INTERVAL 444 ms    QTC CALCULATION (BEZET) 444 ms    P Axis 60 degrees    R AXIS 70 degrees    T AXIS 68 degrees    MUSE DIAGNOSIS       Normal sinus rhythm  Normal ECG  No previous ECGs available  Confirmed by CAMPBELL RAYMUNDO MD LOC:[facility] ([id number]) on 3/18/2019 3:43:49 PM     HM2(CBC w/o Differential)    Collection Time: 03/18/19  1:55 PM   Result Value Ref Range    WBC 6.1 4.0 - 11.0 thou/uL    RBC 4.50 4.40 - 6.20 mill/uL    Hemoglobin 13.8 (L) 14.0 - 18.0 g/dL    Hematocrit 41.1 40.0 - 54.0 %    MCV 91 80 - 100 fL    MCH 30.7 27.0 - 34.0 pg    MCHC 33.6 32.0 - 36.0 g/dL    RDW 12.1 11.0 - 14.5 %    Platelets 361 140 - 440 thou/uL    MPV 6.8 (L) 7.0 - 10.0 fL   Comprehensive Metabolic Panel    Collection Time: 03/18/19  1:55 PM   Result Value Ref Range    Sodium 131 (L) 136 - 145 mmol/L    Potassium 4.3 3.5 - 5.0 mmol/L    Chloride 95 (L) 98 - 107 mmol/L    CO2 28 22 - 31 mmol/L    Anion Gap, Calculation 8 5 - 18 mmol/L    Glucose 94 70 - 125 mg/dL    BUN 14 8 - 28 mg/dL    Creatinine 0.73 0.70 - 1.30 mg/dL    GFR MDRD Af Amer >60 >60 mL/min/1.73m2    GFR MDRD Non Af Amer >60 >60 mL/min/1.73m2    Bilirubin, Total 0.4 0.0 - 1.0 mg/dL    Calcium 9.0 8.5 - 10.5 mg/dL    Protein, Total 6.3 6.0 - 8.0 g/dL    Albumin 3.4 (L) 3.5 - 5.0 g/dL    Alkaline Phosphatase 64 45 - 120 U/L    AST 26 0 - 40 U/L    ALT 18 0 - 45 U/L        Immunization History   Administered Date(s) Administered     Hep A, historic 12/16/1998, 10/26/1999     Hep B, historic 10/26/1999, 12/07/1999, 06/26/2000     IPV 04/05/1993     Influenza, inj, historic,unspecified 12/16/1998, 10/22/1999, 10/04/2010, 09/28/2012, 09/18/2014, 10/04/2015, 10/04/2016, 10/11/2017     Influenza, seasonal,quad inj 6-35 mos 10/27/2011, 09/23/2013     Pneumo Polysac 23-V 04/22/2014      Td,adult,historic,unspecified 10/05/1992, 08/02/2002, 04/22/2014           Electronically signed by Marco A Meyers MD 03/19/19 1:12 PM

## 2021-06-30 NOTE — PROGRESS NOTES
Progress Notes by Marco A Meyers MD at 12/21/2020  9:40 AM     Author: Marco A Meyers MD Service: -- Author Type: Physician    Filed: 12/21/2020 12:11 PM Encounter Date: 12/21/2020 Status: Signed    : Marco A Meyers MD (Physician)       OFFICE VISIT - FAMILY MEDICINE     ASSESSMENT AND PLAN     1. Tongue lesion  Ambulatory referral to ENT   Nonhealing left lower tongue lesion, for the past few months, concerned for malignancy, discussed with Dr. Gillette, he will see the patient today for further care and recommendation.  Time and  place of appointment written and given to patient.    CHIEF COMPLAINT   sore mouth    HPI   Venu Dunham is a 75 y.o. male.    Nonhealing lesion underneath the left tongue area, has been present for the past couple of months, no triggers that patient is aware of, no pain, no difficulty swallowing.  Complement orifices are negative.  Patient is concerned about squamous cell cancer.  Only medication is baby aspirin.  He is also stating that he will be moving to [place] next year, insurance will change at that time.    Review of Systems As per HPI, otherwise negative.    OBJECTIVE   /77 (Patient Site: Left Arm, Patient Position: Sitting, Cuff Size: Adult Regular)   Pulse 64   Resp 16   Wt 136 lb (61.7 kg)   SpO2 99%   BMI 22.12 kg/m    Physical Exam   Constitutional: He is oriented to person, place, and time. He appears well-developed and well-nourished.   HENT:   Head: Normocephalic and atraumatic.   Mouth/Throat:       Cardiovascular: Normal rate, regular rhythm, normal heart sounds and intact distal pulses. Exam reveals no gallop and no friction rub.   No murmur heard.  Pulmonary/Chest: Effort normal and breath sounds normal. No respiratory distress. He has no wheezes. He has no rales.   Neurological: He is alert and oriented to person, place, and time. Coordination normal.   Psychiatric: He has a normal mood and affect. Judgment  and thought content normal.       [facility]   No family history on file.  Social History     Socioeconomic History   ? Marital status:      Spouse name: Not on file   ? Number of children: Not on file   ? Years of education: Not on file   ? Highest education level: Not on file   Occupational History   ? Not on file   Social Needs   ? Financial resource strain: Not on file   ? Food insecurity     Worry: Not on file     Inability: Not on file   ? Transportation needs     Medical: Not on file     Non-medical: Not on file   Tobacco Use   ? Smoking status: Never Smoker   ? Smokeless tobacco: Never Used   Substance and Sexual Activity   ? Alcohol use: Yes     Alcohol/week: 1.0 standard drinks     Types: 1 Glasses of wine per week   ? Drug use: Not on file   ? Sexual activity: Not on file   Lifestyle   ? Physical activity     Days per week: Not on file     Minutes per session: Not on file   ? Stress: Not on file   Relationships   ? Social connections     Talks on phone: Not on file     Gets together: Not on file     Attends Mormon service: Not on file     Active member of club or organization: Not on file     Attends meetings of clubs or organizations: Not on file     Relationship status: Not on file   ? Intimate partner violence     Fear of current or ex partner: Not on file     Emotionally abused: Not on file     Physically abused: Not on file     Forced sexual activity: Not on file   Other Topics Concern   ? Not on file   Social History Narrative   ? Not on file     Relevant history was reviewed with the patient today, unless noted in HPI, nothing is pertinent for this visit.  [facility]     Patient Active Problem List    Diagnosis Date Noted   ? External Hemorrhoids      Overview Note:     Created by Conversion    Replacement Utility updated for latest IMO load     ? Skin Condition      Overview Note:     Created by [employer] [facility] Annotation: Jul 23 2013  4:36PM - Carlos Varma: left facial    Replacement  Utility updated for latest IMO load       Past Surgical History:   Procedure Laterality Date   ? NOSE SURGERY     ? OK APPENDECTOMY      Description: Appendectomy;  Recorded: 04/22/2014;   ? OK KNEE SCOPE,DIAGNOSTIC      Description: Arthroscopy Knee Left;  Recorded: 04/22/2014;       RESULTS/CONSULTS (Lab/Rad)   No results found for this or any previous visit (from the past 168 hour(s)).  No results found.  MEDICATIONS     Current Outpatient Medications on File Prior to Visit   Medication Sig Dispense Refill   ? aspirin 81 MG EC tablet Take 81 mg by mouth daily.       No current facility-administered medications on file prior to visit.        HEALTH MAINTENANCE / SCREENING   PHQ-2 Total Score: 0 (6/4/2020 11:04 AM)  , No data recorded,No data recorded  Immunization History   Administered Date(s) Administered   ? Hep A, historic 12/16/1998, 10/26/1999   ? Hep B, historic 10/26/1999, 12/07/1999, 06/26/2000   ? IPV 04/05/1993   ? Influenza high dose,seasonal,PF, 65+ yrs 10/04/2015, 10/04/2016, 10/11/2017, 10/04/2018, 11/05/2019   ? Influenza, inj, historic,unspecified 12/16/1998, 10/22/1999, 10/04/2010, 09/28/2012, 09/18/2014, 10/04/2015, 10/04/2016, 10/11/2017   ? Influenza, seasonal,quad inj 6-35 mos 10/27/2011, 09/23/2013   ? Pneumo Polysac 23-V 04/22/2014   ? Td,adult,historic,unspecified 10/05/1992, 08/02/2002, 04/22/2014     Health Maintenance   Topic   ? HEPATITIS C SCREENING    ? ADVANCE CARE PLANNING    ? ZOSTER VACCINES (1 of 2)   ? MEDICARE ANNUAL WELLNESS VISIT    ? LIPID    ? INFLUENZA VACCINE RULE BASED (1)   ? FALL RISK ASSESSMENT    ? COLORECTAL CANCER SCREENING    ? TD 18+ HE    ? Pneumococcal Vaccine: 65+ Years    ? Pneumococcal Vaccine: Pediatrics (0 to 5 Years) and At-Risk Patients (6 to 64 Years)    ? HEPATITIS B VACCINES        Marco A Meyers MD  Family Medicine, [facility]     This note was dictated using a voice recognition software.  Any grammatical or context  distortion are unintentional and inherent to the software.

## 2021-07-03 NOTE — ADDENDUM NOTE
Addendum Note by Marco A Meyers MD at 6/4/2020 11:20 AM     Author: Marco A Meyers MD Service: -- Author Type: Physician    Filed: 6/5/2020  8:50 AM Encounter Date: 6/4/2020 Status: Signed    : Marco A Meyers MD (Physician)    Addended by: MARCO A MEYERS on: 6/5/2020 08:50 AM        Modules accepted: Orders

## 2021-08-22 ENCOUNTER — HEALTH MAINTENANCE LETTER (OUTPATIENT)
Age: 76
End: 2021-08-22

## 2021-10-17 ENCOUNTER — HEALTH MAINTENANCE LETTER (OUTPATIENT)
Age: 76
End: 2021-10-17

## 2022-07-01 ENCOUNTER — TRANSFERRED RECORDS (OUTPATIENT)
Dept: HEALTH INFORMATION MANAGEMENT | Facility: CLINIC | Age: 77
End: 2022-07-01

## 2022-10-01 ENCOUNTER — HEALTH MAINTENANCE LETTER (OUTPATIENT)
Age: 77
End: 2022-10-01

## 2023-10-15 ENCOUNTER — HEALTH MAINTENANCE LETTER (OUTPATIENT)
Age: 78
End: 2023-10-15